# Patient Record
Sex: MALE | NOT HISPANIC OR LATINO | URBAN - METROPOLITAN AREA
[De-identification: names, ages, dates, MRNs, and addresses within clinical notes are randomized per-mention and may not be internally consistent; named-entity substitution may affect disease eponyms.]

---

## 2020-08-13 ENCOUNTER — CONVERSION ENCOUNTER (OUTPATIENT)
Dept: GASTROENTEROLOGY | Facility: CLINIC | Age: 60
End: 2020-08-13
Attending: INTERNAL MEDICINE

## 2021-01-18 ENCOUNTER — IMMUNIZATION (OUTPATIENT)
Dept: VACCINE CLINIC | Facility: HOSPITAL | Age: 61
End: 2021-01-18

## 2021-01-18 PROCEDURE — 91300 HC SARSCOV02 VAC 30MCG/0.3ML IM: CPT | Performed by: FAMILY MEDICINE

## 2021-01-18 PROCEDURE — 0001A: CPT | Performed by: FAMILY MEDICINE

## 2021-02-08 ENCOUNTER — IMMUNIZATION (OUTPATIENT)
Dept: VACCINE CLINIC | Facility: HOSPITAL | Age: 61
End: 2021-02-08

## 2021-02-08 PROCEDURE — 91300 HC SARSCOV02 VAC 30MCG/0.3ML IM: CPT | Performed by: INTERNAL MEDICINE

## 2021-02-08 PROCEDURE — 0002A: CPT | Performed by: INTERNAL MEDICINE

## 2021-05-10 NOTE — H&P
History and Physical      Patient Name: Marco Sanabria   Patient ID: [id number]   Sex: Male   YOB: 1960        Visit Date: August 13, 2020    Provider: Shiraz Camara MD   Location: [facility]   Location Address: [address] [place] [address]  [id number]   Location Phone: [phone]          Chief Complaint  · Surgical History and Physical  · Screening Colonoscopy      History Of Present Illness  NON-INPATIENT HISTORY AND PHYSICAL  Allergies: NO KNOWN DRUG ALLERGIES   Chief Complaint/History of Present Illness: EGD for History of Yee's Esophagus and Screening Colonoscopy   Colon Recall: No   Failed Outpatient Treatment/Contraindications: N/A   Current Medications: aspirin 81 mg oral tablet,delayed release (DR/EC), metoprolol succinate 100 mg oral tablet extended release 24 hr, NuLYTELY with Flavor Packs 420 gram oral recon soln, and omeprazole 40 mg oral capsule,delayed release(DR/EC)   Significant Past Medical History: History of Yee's esophagus, Hypertension, and Reflux   Significant Family Medical History: No Family History of Colon Cancer   Significant Past Surgical History: Colonoscopy, EGD, and Knee replacement, right   Previous Colonoscopy: Yes YEAR: 2009 By Whom: Josef Chiu   Previous EGD: Yes YEAR: 2011 By Whom: Josef Chiu   PHYSICAL EXAM:  Heart: Regular Rate and Rhythm   Lungs: Breathing Unlabored           Assessment  · Preoperative examination     V72.84/Z01.818  · Screening for colon cancer     V76.51/Z12.11  · History of Yee's esophagus     V12.79/Z87.19      Plan  · Orders  o Consent for Esophagogastroduodenoscopy (EGD) with biopsy - Possible risks/complications, benefits, and alternatives to surgical or invasive procedure have been explained to patient and/or legal guardian. - Patient has been evaluated and can tolerate anesthesia and/or sedation. Risks, benefits, and alternatives to anesthesia and sedation have been explained to patient and/or legal  guardian. ([id number]) - V12.79/Z87.19 - 10/02/2020  o Consent for Colonoscopy Screening -Possible risk/complications, benefits, and alternatives to surgical or invasive procedure have been explained to patient and/or legal gaurdian. -Patient has been evaluated and can tolerate anethesia and/or sedation. Risk, benefits, and alternatives to anesthesia and sedation have been explained to patient or legal gaurdian. () - V76.51/Z12.11 - 10/02/2020  · Medications  o Medications have been Reconciled  o Transition of Care or Provider Policy  · Instructions  o ****Surgical Orders****  o ***************  o Outpatient  o ***************  o RISK AND BENEFITS:  o Possible risks/complications, benefits and alternatives to surgical or invasive procedure have been explained to the patient and/or legal guardian.  o Patient has been evaluated and can tolerate anesthesia and/or sedation. Risks, benefits, and alternatives to anesthesia and sedation have been explained to the patient and/or legal guardian.  o ***************  o PREP: Per protocol  o IV: Per Anesthesia  o The above History and Physical Examination has been completed within 30 days of admission.  o This note has been transcribed by LEE Da Silva MA. I have read and agree with the findings in this note.  o Electronically Identified Patient Education Materials Provided Electronically  · Disposition  o Call or Return if symptoms worsen or persist.            Electronically Signed by: Deangelo Da Silva, -Author on August 13, 2020 08:50:18 AM